# Patient Record
(demographics unavailable — no encounter records)

---

## 2025-02-24 NOTE — DISCUSSION/SUMMARY
[FreeTextEntry1] : Sussy is a pleasant 41-year-old RH woman, Nepali speaking, with epilepsy. Gave birth to a healthy baby girl. No longer breast feeding. Baby is 1 year 3 months.   She carries a diagnosis of epilepsy. Has nocturnal seizures with stiffening, tongue bite, headache and confusion. No aura. Had a breakthrough seizure in February in setting of compliance with seizure medications. Lev was increased to 1 gram bid. Will do close follow-up. Ambulatory EEG 5/2023 with occasional left anterior temporal slowing and rare left anterior temporal LRDA. No seizures were seen. Levetiracetam has a favorable profile to a developing fetus compared to other AEDS.   Most recent ambulatory EEG 4/2024 was normal. Will get MRI brain w/wo contrast given history of epilepsy. Had an event 11/1 with right tongue bite and her left calf felt tight and she felt out of it. possible seizure. Was very stressed at the time. Discussed that it is possible it was a nocturnal seizure. Discussed seizure precautions. Will continue same dose of LEV as she is on a high dose, will get AED levels. Discussed that if she has another event we will have to add another medication (lamotrigine).

## 2025-02-24 NOTE — ASSESSMENT
[FreeTextEntry1] : Please continue levetiracetam 1250 mg every 12 hours Continue vitamin d 1000 units daily Please get MRI of the brain - we will call you once we get insurance authorization Return to clinic in 5 months to see Dr. Best   Seizure Safety:  Wear a helmet when biking or horseback riding No unsupervised swimming Showers rather than baths - no bath alone - risk of drowning Adjust the temperature on hot water heater to avoid scalding If uncontrolled seizures, use microwave rather than stovetop Dont lock door in bathroom, bedroom or on places If fall off bed with seizures, try sleeping with mattress on the floor Use soft or padded furniture Avoid high ladders Take medication as prescribed Follow driving regulations of people with epilepsy. Please visit [facility] [facility] : [email or web address]://www.epilepsy.com/.

## 2025-02-24 NOTE — PHYSICAL EXAM
[FreeTextEntry1] :  Mental Status: alert, oriented. Gives detailed history. Euthymic affect  III, IV, VI, EOMI  VII normal squint vs resistance, normal smile, face symmetric.  VIII: normal hearing  Motor: no drift in upper or lower extremities Gait : normal balance and gait

## 2025-02-24 NOTE — DATA REVIEWED
[de-identified] : 11/6/2024: Ambulatory EEG: Rare left temporal slowing.  4/3/2024: Ambulatory EEG: EEG was normal  5/17/23: Ambulatory EEG: occasional left anterior temporal slowing, rare left anterior temporal rhythmic delta activity.  No seizures were seen. rEEG: occasional left anterior temporal slowing.  [de-identified] : 11/2024 creatinine 0.63, levetiracetam 14.5, 6/2024 vitamin d 24.5 low , vitamin b12 1013 6/2023 levetiracetam 9.7 , CMP sodium 133, creatinine 0.48, sodium 133
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
